# Patient Record
Sex: FEMALE | URBAN - METROPOLITAN AREA
[De-identification: names, ages, dates, MRNs, and addresses within clinical notes are randomized per-mention and may not be internally consistent; named-entity substitution may affect disease eponyms.]

---

## 2023-01-25 ENCOUNTER — APPOINTMENT (RX ONLY)
Dept: URBAN - METROPOLITAN AREA CLINIC 74 | Facility: CLINIC | Age: 33
Setting detail: DERMATOLOGY
End: 2023-01-25

## 2023-01-25 DIAGNOSIS — L30.9 DERMATITIS, UNSPECIFIED: ICD-10-CM

## 2023-01-25 DIAGNOSIS — L23.9 ALLERGIC CONTACT DERMATITIS, UNSPECIFIED CAUSE: ICD-10-CM

## 2023-01-25 PROCEDURE — ? PRESCRIPTION MEDICATION MANAGEMENT

## 2023-01-25 PROCEDURE — ? COUNSELING

## 2023-01-25 PROCEDURE — 99214 OFFICE O/P EST MOD 30 MIN: CPT

## 2023-01-25 PROCEDURE — ? PRESCRIPTION

## 2023-01-25 PROCEDURE — ? ADDITIONAL NOTES

## 2023-01-25 PROCEDURE — ? MEDICATION COUNSELING

## 2023-01-25 RX ORDER — RUXOLITINIB 15 MG/G
CREAM TOPICAL
Qty: 60 | Refills: 0 | Status: ACTIVE

## 2023-01-25 RX ORDER — RUXOLITINIB 15 MG/G
CREAM TOPICAL
Qty: 60 | Refills: 3 | Status: ERX | COMMUNITY
Start: 2023-01-25

## 2023-01-25 RX ADMIN — FLUCONAZOLE: 200 TABLET ORAL at 00:00

## 2023-01-25 RX ADMIN — RUXOLITINIB: 15 CREAM TOPICAL at 00:00

## 2023-01-25 NOTE — PROCEDURE: ADDITIONAL NOTES
Render Risk Assessment In Note?: no
Detail Level: Simple
Additional Notes: Discussed patch testing with Dr. Londono of no improvement.

## 2023-01-25 NOTE — PROCEDURE: PRESCRIPTION MEDICATION MANAGEMENT
Initiate Treatment: Opzelura 1.5 % topical cream \\nQuantity: 60.0 g  Days Supply: 30\\nSig: Apply to AA area on face bid for 2 weeks.\\n\\nfluconazole 100 mg tablet \\nQuantity: 4.0 Tablet\\nSig: Take one tablet by mouth ever week for 4 weeks
Detail Level: Zone
Render In Strict Bullet Format?: No

## 2023-01-25 NOTE — PROCEDURE: MEDICATION COUNSELING
fair, will monitor progress closely Fluconazole Counseling:  Patient counseled regarding adverse effects of fluconazole including but not limited to headache, diarrhea, nausea, upset stomach, liver function test abnormalities, taste disturbance, and stomach pain.  There is a rare possibility of liver failure that can occur when taking fluconazole.  The patient understands that monitoring of LFTs and kidney function test may be required, especially at baseline. The patient verbalized understanding of the proper use and possible adverse effects of fluconazole.  All of the patient's questions and concerns were addressed.

## 2023-01-25 NOTE — PROCEDURE: PRESCRIPTION MEDICATION MANAGEMENT
Detail Level: Zone
Render In Strict Bullet Format?: No
Initiate Treatment: Opzelura 1.5 % topical cream \\nQuantity: 60.0 g  Days Supply: 30\\nSig: Apply bid to AA on face for two weeks.

## 2023-01-25 NOTE — PROCEDURE: ADDITIONAL NOTES
Render Risk Assessment In Note?: no
Detail Level: Simple
Additional Notes: Discussed patch testing with Dr. Londono as there are no known allergies.

## 2023-01-26 RX ORDER — FLUCONAZOLE 200 MG/1
TABLET ORAL
Qty: 4 | Refills: 0 | Status: ERX | COMMUNITY
Start: 2023-01-25